# Patient Record
Sex: MALE | Race: WHITE | Employment: OTHER | URBAN - METROPOLITAN AREA
[De-identification: names, ages, dates, MRNs, and addresses within clinical notes are randomized per-mention and may not be internally consistent; named-entity substitution may affect disease eponyms.]

---

## 2017-09-07 ENCOUNTER — HOSPITAL ENCOUNTER (OUTPATIENT)
Dept: LAB | Age: 67
Discharge: HOME | End: 2017-09-07
Attending: INTERNAL MEDICINE
Payer: COMMERCIAL

## 2017-09-07 DIAGNOSIS — E83.52 HYPERCALCEMIA: ICD-10-CM

## 2017-09-07 PROBLEM — E78.00 HYPERCHOLESTEROLEMIA: Status: ACTIVE | Noted: 2017-09-07

## 2017-09-07 PROBLEM — Z86.718 HISTORY OF DEEP VENOUS THROMBOSIS: Status: ACTIVE | Noted: 2017-09-07

## 2017-09-07 PROBLEM — Z85.828 HISTORY OF SKIN CANCER: Status: ACTIVE | Noted: 2017-09-07

## 2017-09-07 PROBLEM — K58.9 IRRITABLE BOWEL SYNDROME: Status: ACTIVE | Noted: 2017-09-07

## 2017-09-07 PROBLEM — F17.200 TOBACCO DEPENDENCY: Status: ACTIVE | Noted: 2017-09-07

## 2017-09-07 PROBLEM — G71.11 MYOTONIC DYSTROPHY (HCC): Status: ACTIVE | Noted: 2017-09-07

## 2017-09-07 PROBLEM — G43.909 MIGRAINE WITHOUT STATUS MIGRAINOSUS, NOT INTRACTABLE: Status: ACTIVE | Noted: 2017-09-07

## 2017-09-07 LAB
ANION GAP SERPL CALC-SCNC: 6 MMOL/L
BUN SERPL-MCNC: 13 MG/DL (ref 8–23)
CALCIUM SERPL-MCNC: 10.4 MG/DL (ref 8.3–10.4)
CHLORIDE SERPL-SCNC: 105 MMOL/L (ref 98–107)
CO2 SERPL-SCNC: 28 MMOL/L (ref 21–32)
CREAT SERPL-MCNC: 1.1 MG/DL (ref 0.8–1.5)
GLUCOSE SERPL-MCNC: 99 MG/DL (ref 65–100)
POTASSIUM SERPL-SCNC: 4.2 MMOL/L (ref 3.5–5.1)
SODIUM SERPL-SCNC: 139 MMOL/L (ref 136–145)

## 2017-09-07 PROCEDURE — 36415 COLL VENOUS BLD VENIPUNCTURE: CPT | Performed by: INTERNAL MEDICINE

## 2017-09-07 PROCEDURE — 80048 BASIC METABOLIC PNL TOTAL CA: CPT | Performed by: INTERNAL MEDICINE

## 2017-09-07 NOTE — LETTER
9/8/2017 12:57 PM 
 
Mr. Carlos Oviedo [address] [place] [address] Dear Carlos Oviedo: 
 
Please find your most recent results below. Could you please call our office, I tried to call you on the only number we have [phone] and mail box is not set up         Thank you Vito Almodovar Please call me if you have any questions: [phone] Sincerely, [facility]

## 2017-09-15 NOTE — PROGRESS NOTES
Pt was made aware of his labs  (phone number was put in computer wrong  Correct # [phone] is correct )